# Patient Record
Sex: FEMALE | Race: WHITE | URBAN - METROPOLITAN AREA
[De-identification: names, ages, dates, MRNs, and addresses within clinical notes are randomized per-mention and may not be internally consistent; named-entity substitution may affect disease eponyms.]

---

## 2017-10-18 ENCOUNTER — APPOINTMENT (RX ONLY)
Dept: URBAN - METROPOLITAN AREA CLINIC 61 | Facility: CLINIC | Age: 67
Setting detail: DERMATOLOGY
End: 2017-10-18

## 2017-10-18 DIAGNOSIS — L57.0 ACTINIC KERATOSIS: ICD-10-CM

## 2017-10-18 DIAGNOSIS — L81.4 OTHER MELANIN HYPERPIGMENTATION: ICD-10-CM

## 2017-10-18 DIAGNOSIS — D18.0 HEMANGIOMA: ICD-10-CM

## 2017-10-18 DIAGNOSIS — D22 MELANOCYTIC NEVI: ICD-10-CM

## 2017-10-18 DIAGNOSIS — Z71.89 OTHER SPECIFIED COUNSELING: ICD-10-CM

## 2017-10-18 DIAGNOSIS — L57.8 OTHER SKIN CHANGES DUE TO CHRONIC EXPOSURE TO NONIONIZING RADIATION: ICD-10-CM

## 2017-10-18 DIAGNOSIS — L20.89 OTHER ATOPIC DERMATITIS: ICD-10-CM

## 2017-10-18 DIAGNOSIS — L82.1 OTHER SEBORRHEIC KERATOSIS: ICD-10-CM

## 2017-10-18 PROBLEM — L20.84 INTRINSIC (ALLERGIC) ECZEMA: Status: ACTIVE | Noted: 2017-10-18

## 2017-10-18 PROBLEM — D22.5 MELANOCYTIC NEVI OF TRUNK: Status: ACTIVE | Noted: 2017-10-18

## 2017-10-18 PROBLEM — D18.01 HEMANGIOMA OF SKIN AND SUBCUTANEOUS TISSUE: Status: ACTIVE | Noted: 2017-10-18

## 2017-10-18 PROBLEM — D48.5 NEOPLASM OF UNCERTAIN BEHAVIOR OF SKIN: Status: ACTIVE | Noted: 2017-10-18

## 2017-10-18 PROCEDURE — 99203 OFFICE O/P NEW LOW 30 MIN: CPT | Mod: 25

## 2017-10-18 PROCEDURE — 11100: CPT

## 2017-10-18 PROCEDURE — ? COUNSELING

## 2017-10-18 PROCEDURE — ? PRESCRIPTION

## 2017-10-18 PROCEDURE — ? BIOPSY BY SHAVE METHOD

## 2017-10-18 RX ORDER — TRIAMCINOLONE ACETONIDE 1 MG/G
CREAM TOPICAL
Qty: 80 | Refills: 11 | Status: ERX

## 2017-10-18 RX ORDER — FLUOROURACIL 10 MG/G
CREAM TOPICAL
Qty: 30 | Refills: 0 | Status: ERX

## 2017-10-18 ASSESSMENT — LOCATION ZONE DERM
LOCATION ZONE: ARM
LOCATION ZONE: TRUNK

## 2017-10-18 ASSESSMENT — LOCATION SIMPLE DESCRIPTION DERM
LOCATION SIMPLE: RIGHT SHOULDER
LOCATION SIMPLE: LEFT SHOULDER
LOCATION SIMPLE: LEFT UPPER BACK

## 2017-10-18 ASSESSMENT — LOCATION DETAILED DESCRIPTION DERM
LOCATION DETAILED: LEFT POSTERIOR SHOULDER
LOCATION DETAILED: LEFT SUPERIOR UPPER BACK
LOCATION DETAILED: LEFT MID-UPPER BACK
LOCATION DETAILED: RIGHT POSTERIOR SHOULDER

## 2017-10-18 NOTE — PROCEDURE: BIOPSY BY SHAVE METHOD
Electrodesiccation And Curettage Text: The wound bed was treated with electrodesiccation and curettage after the biopsy was performed.
Lab: [id number]
Consent: Verbal consent was obtained and risks were reviewed including but not limited to scarring, infection, bleeding, scabbing, incomplete removal, nerve damage and allergy to anesthesia.
Dressing: pressure dressing with telfa
X Size Of Lesion In Cm: 0
Destruction After The Procedure: No
Electrodesiccation Text: The wound bed was treated with electrodesiccation after the biopsy was performed.
Notification Instructions: Patient will be notified of biopsy results. However, patient instructed to call the office if not contacted within 2 weeks.
Biopsy Type: H and E
Render Post-Care Instructions In Note?: yes
Silver Nitrate Text: The wound bed was treated with silver nitrate after the biopsy was performed.
Anesthesia Type: 1% lidocaine without epinephrine
Cryotherapy Text: The wound bed was treated with cryotherapy after the biopsy was performed.
Anesthesia Volume In Cc (Will Not Render If 0): 1
Billing Type: Third-Party Bill
Biopsy Method: Dermablade
Post-Care Instructions: I reviewed with the patient in detail post-care instructions. Patient is to keep the biopsy site dry overnight, and then apply vaseline twice daily until healed.
Type Of Destruction Used: Electrodesiccation
Lab Facility: [id number]
Detail Level: Detailed
Hemostasis: Drysol
Wound Care: Vaseline

## 2018-05-17 ENCOUNTER — HOSPITAL ENCOUNTER (OUTPATIENT)
Dept: HOSPITAL 61 - SURG | Age: 68
Discharge: HOME | End: 2018-05-17
Attending: ORTHOPAEDIC SURGERY
Payer: MEDICARE

## 2018-05-17 VITALS
DIASTOLIC BLOOD PRESSURE: 72 MMHG | SYSTOLIC BLOOD PRESSURE: 147 MMHG | DIASTOLIC BLOOD PRESSURE: 72 MMHG | SYSTOLIC BLOOD PRESSURE: 147 MMHG

## 2018-05-17 DIAGNOSIS — Z86.718: ICD-10-CM

## 2018-05-17 DIAGNOSIS — K21.9: ICD-10-CM

## 2018-05-17 DIAGNOSIS — Z88.2: ICD-10-CM

## 2018-05-17 DIAGNOSIS — Z87.891: ICD-10-CM

## 2018-05-17 DIAGNOSIS — Z72.89: ICD-10-CM

## 2018-05-17 DIAGNOSIS — Z98.890: ICD-10-CM

## 2018-05-17 DIAGNOSIS — T84.84XA: Primary | ICD-10-CM
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
## 2018-05-17 DIAGNOSIS — G89.18: ICD-10-CM
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
## 2018-05-17 DIAGNOSIS — Z79.899: ICD-10-CM

## 2018-05-17 DIAGNOSIS — Z88.5: ICD-10-CM

## 2018-05-17 DIAGNOSIS — Z87.440: ICD-10-CM

## 2018-05-17 DIAGNOSIS — J45.909: ICD-10-CM

## 2018-05-17 DIAGNOSIS — Z88.0: ICD-10-CM

## 2018-05-17 PROCEDURE — 20680 REMOVAL OF IMPLANT DEEP: CPT

## 2018-05-17 PROCEDURE — 76000 FLUOROSCOPY <1 HR PHYS/QHP: CPT

## 2018-05-17 RX ADMIN — LIDOCAINE HYDROCHLORIDE 1 ML: 10 INJECTION, SOLUTION EPIDURAL; INFILTRATION; INTRACAUDAL; PERINEURAL at 12:04

## 2018-05-17 RX ADMIN — SODIUM CHLORIDE, SODIUM LACTATE, POTASSIUM CHLORIDE, AND CALCIUM CHLORIDE 1 MLS/HR: .6; .31; .03; .02 INJECTION, SOLUTION INTRAVENOUS at 10:53

## 2018-05-17 RX ADMIN — BUPIVACAINE HYDROCHLORIDE 1 ML: 5 INJECTION, SOLUTION PERINEURAL at 12:04

## 2018-05-17 RX ADMIN — CLINDAMYCIN PHOSPHATE 1 MLS/HR: 18 INJECTION, SOLUTION INTRAVENOUS at 11:52

## 2019-10-28 ENCOUNTER — APPOINTMENT (RX ONLY)
Dept: URBAN - METROPOLITAN AREA CLINIC 61 | Facility: CLINIC | Age: 69
Setting detail: DERMATOLOGY
End: 2019-10-28

## 2019-10-28 DIAGNOSIS — L57.8 OTHER SKIN CHANGES DUE TO CHRONIC EXPOSURE TO NONIONIZING RADIATION: ICD-10-CM

## 2019-10-28 DIAGNOSIS — L82.1 OTHER SEBORRHEIC KERATOSIS: ICD-10-CM

## 2019-10-28 DIAGNOSIS — D18.0 HEMANGIOMA: ICD-10-CM

## 2019-10-28 DIAGNOSIS — D22 MELANOCYTIC NEVI: ICD-10-CM

## 2019-10-28 DIAGNOSIS — L20.89 OTHER ATOPIC DERMATITIS: ICD-10-CM

## 2019-10-28 DIAGNOSIS — L72.8 OTHER FOLLICULAR CYSTS OF THE SKIN AND SUBCUTANEOUS TISSUE: ICD-10-CM

## 2019-10-28 DIAGNOSIS — L57.0 ACTINIC KERATOSIS: ICD-10-CM

## 2019-10-28 PROBLEM — D22.5 MELANOCYTIC NEVI OF TRUNK: Status: ACTIVE | Noted: 2019-10-28

## 2019-10-28 PROBLEM — D18.01 HEMANGIOMA OF SKIN AND SUBCUTANEOUS TISSUE: Status: ACTIVE | Noted: 2019-10-28

## 2019-10-28 PROBLEM — L20.84 INTRINSIC (ALLERGIC) ECZEMA: Status: ACTIVE | Noted: 2019-10-28

## 2019-10-28 PROBLEM — D48.5 NEOPLASM OF UNCERTAIN BEHAVIOR OF SKIN: Status: ACTIVE | Noted: 2019-10-28

## 2019-10-28 PROCEDURE — 11102 TANGNTL BX SKIN SINGLE LES: CPT

## 2019-10-28 PROCEDURE — 99214 OFFICE O/P EST MOD 30 MIN: CPT | Mod: 25

## 2019-10-28 PROCEDURE — ? COUNSELING

## 2019-10-28 PROCEDURE — ? PRESCRIPTION

## 2019-10-28 PROCEDURE — ? BIOPSY BY SHAVE METHOD

## 2019-10-28 RX ORDER — TRIAMCINOLONE ACETONIDE 1 MG/G
CREAM TOPICAL
Qty: 1 | Refills: 3 | Status: ERX | COMMUNITY
Start: 2019-10-28

## 2019-10-28 RX ORDER — FLUOROURACIL 2 G/40G
CREAM TOPICAL
Qty: 1 | Refills: 0 | Status: ERX | COMMUNITY
Start: 2019-10-28

## 2019-10-28 RX ADMIN — TRIAMCINOLONE ACETONIDE: 1 CREAM TOPICAL at 13:11

## 2019-10-28 RX ADMIN — FLUOROURACIL: 2 CREAM TOPICAL at 13:17

## 2019-10-28 ASSESSMENT — LOCATION SIMPLE DESCRIPTION DERM
LOCATION SIMPLE: RIGHT SHOULDER
LOCATION SIMPLE: RIGHT UPPER BACK
LOCATION SIMPLE: RIGHT LIP
LOCATION SIMPLE: LEFT ANTERIOR NECK
LOCATION SIMPLE: LEFT LOWER BACK
LOCATION SIMPLE: ABDOMEN

## 2019-10-28 ASSESSMENT — LOCATION ZONE DERM
LOCATION ZONE: LIP
LOCATION ZONE: ARM
LOCATION ZONE: TRUNK
LOCATION ZONE: NECK

## 2019-10-28 ASSESSMENT — LOCATION DETAILED DESCRIPTION DERM
LOCATION DETAILED: RIGHT UPPER CUTANEOUS LIP
LOCATION DETAILED: RIGHT ANTERIOR SHOULDER
LOCATION DETAILED: RIGHT SUPERIOR UPPER BACK
LOCATION DETAILED: LEFT SUPERIOR LATERAL MIDBACK
LOCATION DETAILED: EPIGASTRIC SKIN
LOCATION DETAILED: LEFT SUPERIOR ANTERIOR NECK

## 2019-10-28 ASSESSMENT — PAIN INTENSITY VAS: HOW INTENSE IS YOUR PAIN 0 BEING NO PAIN, 10 BEING THE MOST SEVERE PAIN POSSIBLE?: NO PAIN

## 2019-10-28 ASSESSMENT — SEVERITY ASSESSMENT: SEVERITY: MILD

## 2019-10-28 NOTE — PROCEDURE: BIOPSY BY SHAVE METHOD
Anesthesia Type: 1% lidocaine without epinephrine
Post-Care Instructions: I reviewed with the patient in detail post-care instructions. Patient is to keep the biopsy site dry overnight, and then apply vaseline twice daily until healed.
Destruction After The Procedure: No
Additional Anesthesia Volume In Cc (Will Not Render If 0): 0
Lab Facility: [id number]
Cryotherapy Text: The wound bed was treated with cryotherapy after the biopsy was performed.
Depth Of Biopsy: dermis
Electrodesiccation Text: The wound bed was treated with electrodesiccation after the biopsy was performed.
Notification Instructions: Patient will be notified of biopsy results. However, patient instructed to call the office if not contacted within 2 weeks.
Wound Care: Vaseline
Biopsy Type: H and E
Consent: Verbal consent was obtained and risks were reviewed including but not limited to scarring, infection, bleeding, scabbing, incomplete removal, nerve damage and allergy to anesthesia.
Electrodesiccation And Curettage Text: The wound bed was treated with electrodesiccation and curettage after the biopsy was performed.
Anesthesia Volume In Cc (Will Not Render If 0): 1
Type Of Destruction Used: Electrodesiccation
Silver Nitrate Text: The wound bed was treated with silver nitrate after the biopsy was performed.
Render Post-Care Instructions In Note?: yes
Dressing: pressure dressing with telfa
Hemostasis: Drysol
Biopsy Method: Dermablade
Billing Type: Third-Party Bill
Detail Level: Detailed
Lab: [id number]

## 2021-08-25 ENCOUNTER — HOSPITAL ENCOUNTER (OUTPATIENT)
Dept: HOSPITAL 61 - ECHO | Age: 71
End: 2021-08-25
Attending: INTERNAL MEDICINE
Payer: COMMERCIAL

## 2021-08-25 DIAGNOSIS — R01.1: ICD-10-CM

## 2021-08-25 DIAGNOSIS — I08.3: Primary | ICD-10-CM

## 2021-08-25 PROCEDURE — 93306 TTE W/DOPPLER COMPLETE: CPT

## 2021-08-25 NOTE — CARD
MR#: [id number]

Account#: [id number]

Accession#: [id number]

Date of Study: 08/25/2021

Ordering Physician: EMILIE VASQUEZ, 

Referring Physician: EMILIE VASQUEZ, 

Tech: Glenny Gee [facility]





--------------- APPROVED REPORT --------------





EXAM: Two-dimensional and M-mode echocardiogram with Doppler and color Doppler.



Other Information 

Quality : AverageHR: 63bpm

Rhythm : NSR



INDICATION

Murmur 



2D DIMENSIONS 

RVDd3.3 (2.9-3.5cm)Left Atrium(2D)3.3 (1.6-4.0cm)

IVSd1.5 (0.7-1.1cm)Aortic Root(2D)2.7 (2.0-3.7cm)

LVDd4.5 (3.9-5.9cm)LVOT Diameter2.0 (1.8-2.4cm)

PWd1.4 (0.7-1.1cm)LVDs2.2 (2.5-4.0cm)

FS (%) 50.9 %SV75.4 ml

LVEF(%)82.3 (>50%)



Aortic Valve

AoV Peak Clyde.211.7cm/sAoV VTI46.5cm

AO Peak GR.17.9mmHgLVOT Peak Clyde.114.7cm/s

AO Mean GR.9mmHgAVA (VMAX)1.68cm2



Mitral Valve

MV E Wjabnukz12.4cm/sMV DECEL [id number]

MV A Gnradgrm37.5cm/sE/A  Ratio0.8



Pulmonary Valve

PV Peak Eboyptfg594.2cm/s



Tricuspid Valve

TR P. Kuulpeuh974tk/sTR Peak Gr.31mmHg



 LEFT VENTRICLE 

The left ventricle is normal size. There is mild concentric left ventricular hypertrophy. The left ve
ntricular systolic function is normal and the ejection fraction is within normal range. Estimated eje
ction fraction 65%. There is normal LV segmental wall motion. Transmitral Doppler flow pattern is Gra
de I-abnormal relaxation pattern.



 RIGHT VENTRICLE 

The right ventricle is normal size. There is normal right ventricular wall thickness. The right ventr
icular systolic function is normal.



 ATRIA 

The left atrium size is normal. The right atrium size is normal. The interatrial septum is intact wit
h no evidence for an atrial septal defect or patent foramen ovale as noted on 2-D or Doppler imaging.




 AORTIC VALVE 

The aortic valve is calcified with restricted leaflet motion. Doppler and Color Flow revealed no sign
ificant aortic regurgitation. There is mild aortic stenosis visually. Color/doppler evaluation reveal
s no significant stenosis by gradient criteria. 



 MITRAL VALVE 

The mitral valve is normal in structure and function. There is no evidence of mitral valve prolapse. 
There is no mitral valve stenosis. Doppler and Color-flow revealed mild mitral regurgitation.



 TRICUSPID VALVE 

The tricuspid valve is normal in structure and function. Doppler and Color Flow revealed mild tricusp
id regurgitation. RVSP 31 mm Hg. There is no tricuspid valve stenosis.



 PULMONIC VALVE 

Doppler and Color Flow revealed no pulmonic valvular regurgitation. There is no pulmonic valvular reema
nosis.



 GREAT VESSELS 

The aortic root is normal in size. The ascending aorta is normal in size. The IVC is normal in size a
nd collapses >50% with inspiration.



 PERICARDIAL EFFUSION 

There is no evidence of significant pericardial effusion.



Critical Notification

Critical Value: No



<Conclusion>

The left ventricular systolic function is normal and the ejection fraction is within normal range.  E
stimated ejection fraction 65%.

There is normal LV segmental wall motion.

There is mild concentric left ventricular hypertrophy.

There is mild aortic stenosis visually. Color/doppler evaluation reveals no significant stenosis by g
radient criteria.

Doppler and Color Flow revealed mild tricuspid regurgitation. RVSP 31 mm Hg.



Signed by : Emilie Vasquez, 

Electronically Approved : 08/25/2021 22:32:18

## 2021-09-07 ENCOUNTER — HOSPITAL ENCOUNTER (OUTPATIENT)
Dept: HOSPITAL 61 - KCIC DEXA | Age: 71
End: 2021-09-07
Attending: NURSE PRACTITIONER
Payer: COMMERCIAL

## 2021-09-07 DIAGNOSIS — I25.10: ICD-10-CM

## 2021-09-07 DIAGNOSIS — R91.8: ICD-10-CM

## 2021-09-07 DIAGNOSIS — M85.88: Primary | ICD-10-CM

## 2021-09-07 DIAGNOSIS — R06.00: ICD-10-CM

## 2021-09-07 DIAGNOSIS — Z87.891: ICD-10-CM

## 2021-09-07 PROCEDURE — 77080 DXA BONE DENSITY AXIAL: CPT

## 2021-09-07 PROCEDURE — 82565 ASSAY OF CREATININE: CPT

## 2021-09-07 PROCEDURE — 71260 CT THORAX DX C+: CPT

## 2021-09-07 NOTE — [facility]
CT THORAX W



History: Dyspnea. Left-sided chest pain for one month.



Comparison: None.



Technique: CT of the chest with intravenous contrast. Multiplanar reconstruction are provided.



Findings:

Pulmonary arteries: No large or central pulmonary embolism.

Aorta and great vessels: No aneurysm or dissection of the aortic arch or thoracic aorta is seen.

Thyroid: No significant abnormalities.

Mediastinum and mikhail: No mediastinal masses or adenopathy is seen.

Esophagus: The visualized esophagus is normal.

Heart: The heart is normal in size. There is no pericardial effusion. Mild coronary artery calcificat
ion.

Airways, Lungs, Pleura: Airways are clear. No bronchiectasis. 1.0 x 0.4 cm nodule along the left reddy
r fissure and additional 0.4 cm left major fissural nodule (axial image 34 and 36). 0.5 cm right reddy
r fissural nodule (axial 35 toes.. Mild right middle lobe atelectatic change/scarring.

Upper abdomen: Limited evaluation of the upper abdomen is unremarkable.

Osseous structures and soft tissues: Within normal limits for age.



Impression: 



1.  No significant airspace consolidation.

2.  A few bilateral small nodules which most likely represent benign fissural lymph nodes.





------

Exposure: One or more of the following individualized dose reduction techniques were utilized for thi
s examination:  

1. Automated exposure control

2. Adjustment of the mA and/or kV according to patient size

3. Use of iterative reconstruction technique.



Electronically signed by: Tico Cunningham MD (9/7/2021 12:11 PM) [facility]

## 2021-09-07 NOTE — KCIC
INDICATION: Screening for osteopenia/osteoporosis.  Reason: MENOPAUSAL/POST MENOPAUSAL DISORDER / Spl
. Instructions:  / History: 



COMPARISON: None.



TECHNIQUE:  Bone densitometry was performed through the lumbar spine and proximal femur.



IMPRESSION: 



Lumbar Spine: 

BMD: 0.88

T-Score: -1.5

Range: Osteopenic 



Proximal Femur:

BMD: 0.84

T-Score: -0.8

Range: Lower limits of normal 







World Health Organization Criteria for Bone Density:



T-Score:

> -1.0: Normal Range

< -1.0 to -2.5:  Osteopenic Range

< -2.5: Osteoporotic Range



Electronically signed by: Mega Quintero MD (9/7/2021 12:24 PM) [id number]